# Patient Record
Sex: FEMALE | Race: WHITE | Employment: OTHER | URBAN - METROPOLITAN AREA
[De-identification: names, ages, dates, MRNs, and addresses within clinical notes are randomized per-mention and may not be internally consistent; named-entity substitution may affect disease eponyms.]

---

## 2020-10-08 ENCOUNTER — OFFICE VISIT (OUTPATIENT)
Dept: GYNECOLOGY | Age: 60
End: 2020-10-08
Payer: MEDICARE

## 2020-10-08 VITALS
HEART RATE: 78 BPM | WEIGHT: 154.8 LBS | HEIGHT: 64 IN | BODY MASS INDEX: 26.43 KG/M2 | SYSTOLIC BLOOD PRESSURE: 116 MMHG | DIASTOLIC BLOOD PRESSURE: 93 MMHG

## 2020-10-08 DIAGNOSIS — N85.02 COMPLEX ENDOMETRIAL HYPERPLASIA WITH ATYPIA: Primary | ICD-10-CM

## 2020-10-08 PROCEDURE — 99214 OFFICE O/P EST MOD 30 MIN: CPT | Performed by: OBSTETRICS & GYNECOLOGY

## 2020-10-08 RX ORDER — MEDROXYPROGESTERONE ACETATE 10 MG/1
10 TABLET ORAL DAILY
Qty: 30 TAB | Refills: 3 | Status: SHIPPED | OUTPATIENT
Start: 2020-10-08 | End: 2020-12-21

## 2020-10-08 RX ORDER — DICLOFENAC SODIUM 10 MG/G
2 GEL TOPICAL
COMMUNITY

## 2020-10-08 RX ORDER — CLONAZEPAM 0.5 MG/1
TABLET ORAL
COMMUNITY
Start: 2020-09-18

## 2020-10-08 RX ORDER — OMEPRAZOLE 20 MG/1
20 TABLET, DELAYED RELEASE ORAL DAILY PRN
COMMUNITY

## 2020-10-08 RX ORDER — CYCLOBENZAPRINE HCL 5 MG
5 TABLET ORAL AT BEDTIME
COMMUNITY

## 2020-10-08 RX ORDER — IBUPROFEN 200 MG
400 TABLET ORAL
COMMUNITY

## 2020-10-08 NOTE — PROGRESS NOTES
[address] [place] [address] [address] [place]  P [phone]  F [phone]    Office Note  Patient ID:  Name:  Anna Wolf  MRN:  [id number]  :  1960/60 y.o. Date:  10/8/2020      HISTORY OF PRESENT ILLNESS:  Anna Wolf is a 64 y.o.  postmenopausal female who is being seen for possible endometrial cancer. She is referred by Dr. Robbie Pascual in [facility]. She presented for evaluation of postmenopausal bleeding. She was taken to the OR for a hysteroscopy/D&C. 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 I have been asked to see her in consultation for further evaluation and management. ROS:   and GI review:  Negative  Cardiopulmonary review:  Negative   Musculoskeletal:  Negative    A comprehensive review of systems was negative except for that written in the History of Present Illness. , 10 point ROS        Problem List:  There are no active problems to display for this patient. PMH:  Past Medical History:   Diagnosis Date    Depression     Nerve damage     right leg from surgery       PSH:  Past Surgical History:   Procedure Laterality Date    HX HYSTEROSCOPY      D&C    HX LUMBAR LAMINECTOMY      HX OTHER SURGICAL      broken nose      Social History:  Social History     Tobacco Use    Smoking status: Current Every Day Smoker    Smokeless tobacco: Never Used   Substance Use Topics    Alcohol use: Not on file      Family History:  History reviewed. No pertinent family history. Medications: (reviewed)  Current Outpatient Medications   Medication Sig    omeprazole (PRILOSEC OTC) 20 mg tablet Take 20 mg by mouth daily as needed.  ibuprofen (MOTRIN) 200 mg tablet Take 400 mg by mouth every six (6) hours as needed.  diclofenac (VOLTAREN) 1 % gel Apply 2 g to affected area two (2) times daily as needed.     cyclobenzaprine (FLEXERIL) 5 mg tablet Take 5 mg by mouth At bedtime.  clonazePAM (KlonoPIN) 0.5 mg tablet TAKE 1/2 TO 1 TABLET AT BEDTIME AS NEEDED ANXIETY OR SLEEP    medroxyPROGESTERone (PROVERA) 10 mg tablet Take 1 Tab by mouth daily. No current facility-administered medications for this visit. Allergies: (reviewed)  Allergies   Allergen Reactions    Ciprofloxacin Palpitations          OBJECTIVE:    Physical Exam:  VITAL SIGNS: Vitals:    10/08/20 1459   BP: (!) 116/93   Pulse: 78   Weight: 154 lb 12.8 oz (70.2 kg)   Height: 5' 4\" (1.626 m)     Body mass index is 26.57 kg/m². GENERAL DEVON: Conversant, alert, oriented. No acute distress. HEENT: HEENT. No thyroid enlargement. No JVD. Neck: Supple without restrictions. RESPIRATORY: Clear to auscultation and percussion to the bases. No CVAT. CARDIOVASC: RRR without murmur/rub. GASTROINT: soft, non-tender, without masses or organomegaly   MUSCULOSKEL: no joint tenderness, deformity or swelling   EXTREMITIES: extremities normal, atraumatic, no cyanosis or edema   PELVIC: Vulva and vagina appear normal. Bimanual exam reveals normal uterus and adnexa. RECTAL: Deferred   HENRIQUE SURVEY: No suspicious lymphadenopathy or edema noted. NEURO: Grossly intact. No acute deficit. Lab Data:    No results found for: WBC, HGB, HCT, PLT, MCV, HGBEXT, HCTEXT, PLTEXT, HGBEXT, HCTEXT, PLTEXT  No results found for: NA, K, CL, CO2, AGAP, GLU, BUN, CREA, BUCR, GFRAA, GFRNA, CA      Imaging:  None      IMPRESSION/PLAN:  Antonio Watt is a 64 y.o. female with a working diagnosis of complex endometrial hyperplasia with atypia, but an invasive carcinoma cannot be excluded. I reviewed with Antonio Watt her medical records, physical exam, and review of symptoms. I explained the significant risk of an underlying malignancy based upon her pathology from her D&C.   I have recommended that we proceed with robotic assisted laparoscopic hysterectomy with BSO.  We will send the uterus for frozen section pathology to determine the need for staging and lymph node dissection. We will perform a lymph node mapping prior to hysterectomy so that we can perform a sentinel node dissection rather than a complete lymphadenectomy, in order to reduce the risk of potential complications. She and her family are currently going through a very stressful time with legal issues and she wishes to delay surgery for a while. I did not recommend doing that, but I can understand the need. In order to feel comfortable doing that, I am going to get an MRI of the pelvis to assess the uterus and pelvic nodes. I am also going to start her on oral progesterone, to hopefully suppress any cancer. We will hopefully be able to get her on the schedule within a couple of months.       Signed By: Jenaro Liu MD     10/8/2020/1:24 PM

## 2020-10-08 NOTE — LETTER
10/8/2020 3:52 PM 
 
Patient:  Jj Sheridan YOB: 1960 Date of Visit: 10/8/2020 Dear Steffanie Hwang MD 
VIA Facsimile: [phone]: Thank you for referring Ms. Jj Sheridan to me for evaluation/treatment. Below are the relevant portions of my assessment and plan of care. [address] [facility], [address] 
P [phone]  F [phone] Office Note Patient ID: 
Name:  Jj Sheridan MRN:  [id number] :  1960/60 y.o. Date:  10/8/2020 HISTORY OF PRESENT ILLNESS: 
Jj Sheridan is a 64 y.o.  postmenopausal female who is being seen for possible endometrial cancer. She is referred by Dr. Steffanie Hwang in [facility]. She presented for evaluation of postmenopausal bleeding. She was taken to the OR for a hysteroscopy/D&C. 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 I have been asked to see her in consultation for further evaluation and management. ROS: 
 and GI review:  Negative Cardiopulmonary review:  Negative Musculoskeletal:  Negative A comprehensive review of systems was negative except for that written in the History of Present Illness. , 10 point ROS Problem List: 
There are no active problems to display for this patient. PMH: 
Past Medical History:  
Diagnosis Date  Depression  Nerve damage   
 right leg from surgery PSH: 
Past Surgical History:  
Procedure Laterality Date  HX HYSTEROSCOPY    
 D&C  
 HX LUMBAR LAMINECTOMY  HX OTHER SURGICAL    
 broken nose Social History: 
Social History Tobacco Use  Smoking status: Current Every Day Smoker  Smokeless tobacco: Never Used Substance Use Topics  Alcohol use: Not on file Family History: 
History reviewed. No pertinent family history. Medications: (reviewed) Current Outpatient Medications Medication Sig  
 omeprazole (PRILOSEC OTC) 20 mg tablet Take 20 mg by mouth daily as needed.  ibuprofen (MOTRIN) 200 mg tablet Take 400 mg by mouth every six (6) hours as needed.  diclofenac (VOLTAREN) 1 % gel Apply 2 g to affected area two (2) times daily as needed.  cyclobenzaprine (FLEXERIL) 5 mg tablet Take 5 mg by mouth At bedtime.  clonazePAM (KlonoPIN) 0.5 mg tablet TAKE 1/2 TO 1 TABLET AT BEDTIME AS NEEDED ANXIETY OR SLEEP  
 medroxyPROGESTERone (PROVERA) 10 mg tablet Take 1 Tab by mouth daily. No current facility-administered medications for this visit. Allergies: (reviewed) Allergies Allergen Reactions  Ciprofloxacin Palpitations OBJECTIVE: 
 
Physical Exam: VITAL SIGNS: Vitals:  
 10/08/20 1459 BP: (!) 116/93 Pulse: 78 Weight: 154 lb 12.8 oz (70.2 kg) Height: 5' 4\" (1.626 m) Body mass index is 26.57 kg/m². GENERAL DEVON: Conversant, alert, oriented. No acute distress. HEENT: HEENT. No thyroid enlargement. No JVD. Neck: Supple without restrictions. RESPIRATORY: Clear to auscultation and percussion to the bases. No CVAT. CARDIOVASC: RRR without murmur/rub. GASTROINT: soft, non-tender, without masses or organomegaly MUSCULOSKEL: no joint tenderness, deformity or swelling EXTREMITIES: extremities normal, atraumatic, no cyanosis or edema PELVIC: Vulva and vagina appear normal. Bimanual exam reveals normal uterus and adnexa. RECTAL: Deferred HENRIQUE SURVEY: No suspicious lymphadenopathy or edema noted. NEURO: Grossly intact. No acute deficit. Lab Data: 
 
No results found for: WBC, HGB, HCT, PLT, MCV, HGBEXT, HCTEXT, PLTEXT, HGBEXT, HCTEXT, PLTEXT No results found for: NA, K, CL, CO2, AGAP, GLU, BUN, CREA, BUCR, GFRAA, GFRNA, CA Imaging: 
None IMPRESSION/PLAN: 
Sherice Rodrigez is a 64 y.o. female with a working diagnosis of complex endometrial hyperplasia with atypia, but an invasive carcinoma cannot be excluded. I reviewed with Willow Lopez her medical records, physical exam, and review of symptoms. I explained the significant risk of an underlying malignancy based upon her pathology from her D&C. I have recommended that we proceed with robotic assisted laparoscopic hysterectomy with BSO. We will send the uterus for frozen section pathology to determine the need for staging and lymph node dissection. We will perform a lymph node mapping prior to hysterectomy so that we can perform a sentinel node dissection rather than a complete lymphadenectomy, in order to reduce the risk of potential complications. She and her family are currently going through a very stressful time with legal issues and she wishes to delay surgery for a while. I did not recommend doing that, but I can understand the need. In order to feel comfortable doing that, I am going to get an MRI of the pelvis to assess the uterus and pelvic nodes. I am also going to start her on oral progesterone, to hopefully suppress any cancer. We will hopefully be able to get her on the schedule within a couple of months. Signed By: Deshaun Huang MD   
 10/8/2020/1:24 PM  
 
 
New patient referred by [facility] for possible endometrial cancer. If you have questions, please do not hesitate to call me. I look forward to following Ms. Wright along with you.  
 
 
 
Sincerely, 
 
 
Deshaun Huang MD

## 2020-11-17 ENCOUNTER — TELEPHONE (OUTPATIENT)
Dept: GYNECOLOGY | Age: 60
End: 2020-11-17

## 2020-11-17 NOTE — TELEPHONE ENCOUNTER
Patient states she is taking the progesterone as prescribed and plans on scheduling surgery in January.

## 2020-12-11 ENCOUNTER — HOSPITAL ENCOUNTER (OUTPATIENT)
Dept: PREADMISSION TESTING | Age: 60
Discharge: HOME | End: 2020-12-11
Payer: MEDICARE

## 2020-12-11 VITALS
DIASTOLIC BLOOD PRESSURE: 82 MMHG | WEIGHT: 148.37 LBS | BODY MASS INDEX: 26.29 KG/M2 | TEMPERATURE: 98.5 F | HEART RATE: 82 BPM | HEIGHT: 63 IN | SYSTOLIC BLOOD PRESSURE: 123 MMHG | OXYGEN SATURATION: 99 %

## 2020-12-11 LAB
ALBUMIN SERPL-MCNC: 4.1 G/DL (ref 3.5–5)
ALBUMIN/GLOB SERPL: 1.2 {RATIO} (ref 1.1–2.2)
ALP SERPL-CCNC: 59 U/L (ref 45–117)
ALT SERPL-CCNC: 13 U/L (ref 12–78)
ANION GAP SERPL CALC-SCNC: 7 MMOL/L (ref 5–15)
AST SERPL-CCNC: 11 U/L (ref 15–37)
BASOPHILS # BLD: 0 K/UL (ref 0–0.1)
BASOPHILS NFR BLD: 1 % (ref 0–1)
BILIRUB SERPL-MCNC: 0.4 MG/DL (ref 0.2–1)
BUN SERPL-MCNC: 14 MG/DL (ref 6–20)
BUN/CREAT SERPL: 13 (ref 12–20)
CALCIUM SERPL-MCNC: 9.8 MG/DL (ref 8.5–10.1)
CHLORIDE SERPL-SCNC: 107 MMOL/L (ref 97–108)
CO2 SERPL-SCNC: 27 MMOL/L (ref 21–32)
CREAT SERPL-MCNC: 1.1 MG/DL (ref 0.55–1.02)
DIFFERENTIAL METHOD BLD: NORMAL
EOSINOPHIL # BLD: 0.1 K/UL (ref 0–0.4)
EOSINOPHIL NFR BLD: 2 % (ref 0–7)
ERYTHROCYTE [DISTWIDTH] IN BLOOD BY AUTOMATED COUNT: 13.8 % (ref 11.5–14.5)
GLOBULIN SER CALC-MCNC: 3.5 G/DL (ref 2–4)
GLUCOSE SERPL-MCNC: 98 MG/DL (ref 65–100)
HCT VFR BLD AUTO: 40.9 % (ref 35–47)
HGB BLD-MCNC: 13.1 G/DL (ref 11.5–16)
IMM GRANULOCYTES # BLD AUTO: 0 K/UL (ref 0–0.04)
IMM GRANULOCYTES NFR BLD AUTO: 0 % (ref 0–0.5)
LYMPHOCYTES # BLD: 2.5 K/UL (ref 0.8–3.5)
LYMPHOCYTES NFR BLD: 41 % (ref 12–49)
MCH RBC QN AUTO: 29 PG (ref 26–34)
MCHC RBC AUTO-ENTMCNC: 32 G/DL (ref 30–36.5)
MCV RBC AUTO: 90.5 FL (ref 80–99)
MONOCYTES # BLD: 0.3 K/UL (ref 0–1)
MONOCYTES NFR BLD: 5 % (ref 5–13)
NEUTS SEG # BLD: 3.2 K/UL (ref 1.8–8)
NEUTS SEG NFR BLD: 51 % (ref 32–75)
NRBC # BLD: 0 K/UL (ref 0–0.01)
NRBC BLD-RTO: 0 PER 100 WBC
PLATELET # BLD AUTO: 355 K/UL (ref 150–400)
PMV BLD AUTO: 10 FL (ref 8.9–12.9)
POTASSIUM SERPL-SCNC: 4.1 MMOL/L (ref 3.5–5.1)
PROT SERPL-MCNC: 7.6 G/DL (ref 6.4–8.2)
RBC # BLD AUTO: 4.52 M/UL (ref 3.8–5.2)
SODIUM SERPL-SCNC: 141 MMOL/L (ref 136–145)
WBC # BLD AUTO: 6.2 K/UL (ref 3.6–11)

## 2020-12-11 PROCEDURE — 36415 COLL VENOUS BLD VENIPUNCTURE: CPT

## 2020-12-11 PROCEDURE — 80053 COMPREHEN METABOLIC PANEL: CPT

## 2020-12-11 PROCEDURE — 85025 COMPLETE CBC W/AUTO DIFF WBC: CPT

## 2020-12-11 RX ORDER — MELATONIN 5 MG
CAPSULE ORAL
COMMUNITY

## 2020-12-11 NOTE — PERIOP NOTES
Preoperative instructions reviewed with patient. Patient given SSI infection FAQS sheet. Given two bottles of skin prep chlorhexidine soap; given written and verbal instructions on use. Patient was given the opportunity to ask questions on the information provided. INFORMATION REGARDING COVID 19 TESTING PRIOR TO SURGERY WAS PROVIDED TO THE PATIENT WITH THE OPPORTUNITY FOR QUESTIONS. PATIENT VERBALIZED UNDERSTANDING.

## 2020-12-14 NOTE — PROGRESS NOTES
Virtual Visit Consult, pt is scheduled for surgery on 12/21/2020, Dr. Amber Suazo patient, Dr. Amber Suazo is out of office the week of her surgery    1. Have you been to the ER, urgent care clinic since your last visit? Hospitalized since your last visit?  no    2. Have you seen or consulted any other health care providers outside of the [address] [place] [address] since your last visit? Include any pap smears or colon screening.    no

## 2020-12-15 ENCOUNTER — VIRTUAL VISIT (OUTPATIENT)
Dept: GYNECOLOGY | Age: 60
End: 2020-12-15
Payer: MEDICARE

## 2020-12-15 DIAGNOSIS — N85.00 ENDOMETRIAL HYPERPLASIA: Primary | ICD-10-CM

## 2020-12-15 DIAGNOSIS — N95.0 PMB (POSTMENOPAUSAL BLEEDING): ICD-10-CM

## 2020-12-15 PROCEDURE — 99214 OFFICE O/P EST MOD 30 MIN: CPT | Performed by: OBSTETRICS & GYNECOLOGY

## 2020-12-15 PROCEDURE — G8427 DOCREV CUR MEDS BY ELIG CLIN: HCPCS | Performed by: OBSTETRICS & GYNECOLOGY

## 2020-12-15 PROCEDURE — G8432 DEP SCR NOT DOC, RNG: HCPCS | Performed by: OBSTETRICS & GYNECOLOGY

## 2020-12-15 PROCEDURE — 3017F COLORECTAL CA SCREEN DOC REV: CPT | Performed by: OBSTETRICS & GYNECOLOGY

## 2020-12-15 PROCEDURE — G0463 HOSPITAL OUTPT CLINIC VISIT: HCPCS | Performed by: OBSTETRICS & GYNECOLOGY

## 2020-12-15 NOTE — H&P (VIEW-ONLY)
Vickey Bridges is a 64 y.o. female who was seen by synchronous (real-time) audio-video technology on 12/15/2020 [address] [facility], [address] [facility], [address] 
P [phone]  F [phone] Office Note Patient ID: 
Name:  Vickey Bridges MRN:  [id number] :  1960/60 y.o. Date:  12/15/2020 HISTORY OF PRESENT ILLNESS: 
Ms. Vickey Bridges is a 64 y.o. 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 I am seeing her today at Dr. Manda ultz as the patient would like surgery before the holidays but Dr. Rowena Carter does not have any surgical time next week as he is on vacation. She is scheduled with me for this coming Monday. Today is just to meet and review her case. Initial History: 
Vickey Bridges is a 64 y.o.  postmenopausal female who is being seen for possible endometrial cancer. She is referred by Dr. Garnet Lesches in [facility]. She presented for evaluation of postmenopausal bleeding. She was taken to the OR for a hysteroscopy/D&C. 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 I have been asked to see her in consultation for further evaluation and management. ROS: 
 and GI review:  Negative Cardiopulmonary review:  Negative Musculoskeletal:  Negative A comprehensive review of systems was negative except for that written in the History of Present Illness. , 10 point ROS Problem List: 
Patient Active Problem List  
 Diagnosis Date Noted  PMB (postmenopausal bleeding) 12/15/2020  Endometrial hyperplasia 12/15/2020 PMH: 
Past Medical History:  
Diagnosis Date  Arthritis  Depression  Nerve damage   
 right leg from surgery PSH: 
Past Surgical History:  
Procedure Laterality Date [facility] HYSTEROSCOPY    
 D&C  
  HX LUMBAR LAMINECTOMY  09/05/2018  HX OTHER SURGICAL  1989  
 broken nose Social History: 
Social History Tobacco Use  Smoking status: Current Every Day Smoker Packs/day: 1.00 Years: 29.00 Pack years: 29.00  Smokeless tobacco: Never Used Substance Use Topics  Alcohol use: Never Frequency: Never Family History: 
Family History Problem Relation Age of Onset  Lung Disease Mother  Heart Disease Mother  Lung Disease Father  Lung Disease Brother  Anesth Problems Neg Hx Medications: (reviewed) Current Outpatient Medications Medication Sig  cyclobenzaprine (FLEXERIL) 5 mg tablet Take 5 mg by mouth At bedtime.  clonazePAM (KlonoPIN) 0.5 mg tablet TAKE 1/2 TO 1 TABLET AT BEDTIME AS NEEDED ANXIETY OR SLEEP  
 medroxyPROGESTERone (PROVERA) 10 mg tablet Take 1 Tab by mouth daily.  melatonin 5 mg cap capsule Take  by mouth nightly.  omeprazole (PRILOSEC OTC) 20 mg tablet Take 20 mg by mouth daily as needed.  ibuprofen (MOTRIN) 200 mg tablet Take 400 mg by mouth every six (6) hours as needed.  diclofenac (VOLTAREN) 1 % gel Apply 2 g to affected area two (2) times daily as needed. No current facility-administered medications for this visit. Allergies: (reviewed) Allergies Allergen Reactions  Ciprofloxacin Palpitations OBJECTIVE: 
*deferred today given video-conference visit for ongoing COVID-19 pandemic* Physical Exam: VITAL SIGNS: There were no vitals filed for this visit. There is no height or weight on file to calculate BMI. GENERAL DEVON: Conversant, alert, oriented. No acute distress. HEENT: HEENT. No thyroid enlargement. No JVD. Neck: Supple without restrictions. RESPIRATORY: Clear to auscultation and percussion to the bases. No CVAT. CARDIOVASC: RRR without murmur/rub. GASTROINT: soft, non-tender, without masses or organomegaly MUSCULOSKEL: no joint tenderness, deformity or swelling EXTREMITIES: extremities normal, atraumatic, no cyanosis or edema PELVIC: Vulva and vagina appear normal. Bimanual exam reveals normal uterus and adnexa. RECTAL: Deferred HENRIQUE SURVEY: No suspicious lymphadenopathy or edema noted. NEURO: Grossly intact. No acute deficit. Lab Data: 
 
Lab Results Component Value Date/Time WBC 6.2 12/11/2020 11:03 AM  
 HGB 13.1 12/11/2020 11:03 AM  
 HCT 40.9 12/11/2020 11:03 AM  
 PLATELET 894 47/93/1291 11:03 AM  
 MCV 90.5 12/11/2020 11:03 AM  
 
Lab Results Component Value Date/Time Sodium 141 12/11/2020 11:03 AM  
 Potassium 4.1 12/11/2020 11:03 AM  
 Chloride 107 12/11/2020 11:03 AM  
 CO2 27 12/11/2020 11:03 AM  
 Anion gap 7 12/11/2020 11:03 AM  
 Glucose 98 12/11/2020 11:03 AM  
 BUN 14 12/11/2020 11:03 AM  
 Creatinine 1.10 (H) 12/11/2020 11:03 AM  
 BUN/Creatinine ratio 13 12/11/2020 11:03 AM  
 GFR est AA >60 12/11/2020 11:03 AM  
 GFR est non-AA 51 (L) 12/11/2020 11:03 AM  
 Calcium 9.8 12/11/2020 11:03 AM  
 
 
 
 
IMPRESSION/PLAN: 
Juliette Leal is a 64 y.o. female with a working diagnosis of complex endometrial hyperplasia with atypia, but an invasive carcinoma cannot be excluded. Patient Active Problem List  
Diagnosis Code  PMB (postmenopausal bleeding) N95.0  Endometrial hyperplasia N85.00  
 
 
 
 
 I reviewed with Maria Fernandamarshallchet Correa her medical records, physical exam, and review of symptoms as she is new to me. As stated above, I am seeing this patient at Dr. Nneka Manuel request for surgery next week as he will be out of the office. The patient has some family issues ongoing and next week is a good time for her to have her surgery. She is posted for Monday 12/21/2020. Given I do not have robotic block time next week, we will plan to post her case laparoscopic. Plan for TLH/BSO/sentinel lymph node mapping and biopsy, possible exploratory laparotomy, possible pelvic and/or para-aortic LND. She will remain on her progesterone until the time of surgery. All questions and concerns were addressed with the patient and she is comfortable with the plan. Husam Storey MD 
 
 
Pursuant to the emergency declaration unde the [facility], [address] waiver authority and the [employer] Act, this Virtual Visit was conducted, with patient's consent, to reduce the patient's risk of exposure to COVID-19 and provide continuity of care for an established patient. Services were provided through a video synchronous discussion virtually to substitute for in-person clinic visit. I spent at least 25 minutes with this established patient, and >50% of the time was spent counseling and/or coordinating care regarding endometrial hyperplasia Husam Storey MD

## 2020-12-15 NOTE — PROGRESS NOTES
Arvin Romero is a 64 y.o. female who was seen by synchronous (real-time) audio-video technology on 12/15/2020         [address] [facility],[address] [facility], [address], [address]  P [phone]  F [phone]    Office Note  Patient ID:  Name:  Arvin Romero  MRN:  [id number]  :  1960/60 y.o. Date:  12/15/2020      HISTORY OF PRESENT ILLNESS:  Ms. Arvin Romero is a 64 y.o. 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 I am seeing her today at Dr. Rip lutz as the patient would like surgery before the holidays but Dr. Millie Chowdary does not have any surgical time next week as he is on vacation. She is scheduled with me for this coming Monday. Today is just to meet and review her case. Initial History:  Arvin Romero is a 64 y.o.  postmenopausal female who is being seen for possible endometrial cancer. She is referred by Dr. Frank Phelps in [facility]. She presented for evaluation of postmenopausal bleeding. She was taken to the OR for a hysteroscopy/D&C. 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 I have been asked to see her in consultation for further evaluation and management. ROS:   and GI review:  Negative  Cardiopulmonary review:  Negative   Musculoskeletal:  Negative    A comprehensive review of systems was negative except for that written in the History of Present Illness. , 10 point ROS        Problem List:  Patient Active Problem List    Diagnosis Date Noted    PMB (postmenopausal bleeding) 12/15/2020    Endometrial hyperplasia 12/15/2020     PMH:  Past Medical History:   Diagnosis Date    Arthritis     Depression     Nerve damage     right leg from surgery       PSH:  Past Surgical History:   Procedure Laterality Date    HX DILATION AND CURETTAGE      HX HYSTEROSCOPY      D&C    HX LUMBAR LAMINECTOMY  09/05/2018    HX OTHER SURGICAL  1989    broken nose      Social History:  Social History     Tobacco Use    Smoking status: Current Every Day Smoker     Packs/day: 1.00     Years: 29.00     Pack years: 29.00    Smokeless tobacco: Never Used   Substance Use Topics    Alcohol use: Never     Frequency: Never      Family History:  Family History   Problem Relation Age of Onset    Lung Disease Mother     Heart Disease Mother     Lung Disease Father     Lung Disease Brother     Anesth Problems Neg Hx       Medications: (reviewed)  Current Outpatient Medications   Medication Sig    cyclobenzaprine (FLEXERIL) 5 mg tablet Take 5 mg by mouth At bedtime.  clonazePAM (KlonoPIN) 0.5 mg tablet TAKE 1/2 TO 1 TABLET AT BEDTIME AS NEEDED ANXIETY OR SLEEP    medroxyPROGESTERone (PROVERA) 10 mg tablet Take 1 Tab by mouth daily.  melatonin 5 mg cap capsule Take  by mouth nightly.  omeprazole (PRILOSEC OTC) 20 mg tablet Take 20 mg by mouth daily as needed.  ibuprofen (MOTRIN) 200 mg tablet Take 400 mg by mouth every six (6) hours as needed.  diclofenac (VOLTAREN) 1 % gel Apply 2 g to affected area two (2) times daily as needed. No current facility-administered medications for this visit. Allergies: (reviewed)  Allergies   Allergen Reactions    Ciprofloxacin Palpitations          OBJECTIVE:  *deferred today given video-conference visit for ongoing COVID-19 pandemic*    Physical Exam:  VITAL SIGNS: There were no vitals filed for this visit. There is no height or weight on file to calculate BMI. GENERAL DEVON: Conversant, alert, oriented. No acute distress. HEENT: HEENT. No thyroid enlargement. No JVD. Neck: Supple without restrictions. RESPIRATORY: Clear to auscultation and percussion to the bases. No CVAT. CARDIOVASC: RRR without murmur/rub.    GASTROINT: soft, non-tender, without masses or organomegaly   MUSCULOSKEL: no joint tenderness, deformity or swelling EXTREMITIES: extremities normal, atraumatic, no cyanosis or edema   PELVIC: Vulva and vagina appear normal. Bimanual exam reveals normal uterus and adnexa. RECTAL: Deferred   HENRIQUE SURVEY: No suspicious lymphadenopathy or edema noted. NEURO: Grossly intact. No acute deficit. Lab Data:    Lab Results   Component Value Date/Time    WBC 6.2 12/11/2020 11:03 AM    HGB 13.1 12/11/2020 11:03 AM    HCT 40.9 12/11/2020 11:03 AM    PLATELET 873 48/30/7240 11:03 AM    MCV 90.5 12/11/2020 11:03 AM     Lab Results   Component Value Date/Time    Sodium 141 12/11/2020 11:03 AM    Potassium 4.1 12/11/2020 11:03 AM    Chloride 107 12/11/2020 11:03 AM    CO2 27 12/11/2020 11:03 AM    Anion gap 7 12/11/2020 11:03 AM    Glucose 98 12/11/2020 11:03 AM    BUN 14 12/11/2020 11:03 AM    Creatinine 1.10 (H) 12/11/2020 11:03 AM    BUN/Creatinine ratio 13 12/11/2020 11:03 AM    GFR est AA >60 12/11/2020 11:03 AM    GFR est non-AA 51 (L) 12/11/2020 11:03 AM    Calcium 9.8 12/11/2020 11:03 AM           IMPRESSION/PLAN:  Merced Murphy is a 64 y.o. female with a working diagnosis of complex endometrial hyperplasia with atypia, but an invasive carcinoma cannot be excluded. Patient Active Problem List   Diagnosis Code    PMB (postmenopausal bleeding) N95.0    Endometrial hyperplasia N85.00           I reviewed with Merced Murphy her medical records, physical exam, and review of symptoms as she is new to me. As stated above, I am seeing this patient at Dr. Dilma Walsh request for surgery next week as he will be out of the office. The patient has some family issues ongoing and next week is a good time for her to have her surgery. She is posted for Monday 12/21/2020. Given I do not have robotic block time next week, we will plan to post her case laparoscopic. Plan for TLH/BSO/sentinel lymph node mapping and biopsy, possible exploratory laparotomy, possible pelvic and/or para-aortic LND.  She will remain on her progesterone until the time of surgery. All questions and concerns were addressed with the patient and she is comfortable with the plan. Douglas Cohen MD      Pursuant to the emergency declaration unde the [facility] waiver authority and the [employer] Act, this Virtual Visit was conducted, with patient's consent, to reduce the patient's risk of exposure to COVID-19 and provide continuity of care for an established patient. Services were provided through a video synchronous discussion virtually to substitute for in-person clinic visit.      I spent at least 25 minutes with this established patient, and >50% of the time was spent counseling and/or coordinating care regarding endometrial hyperplasia    Douglas Cohen MD

## 2020-12-17 ENCOUNTER — TRANSCRIBE ORDER (OUTPATIENT)
Dept: REGISTRATION | Age: 60
End: 2020-12-17

## 2020-12-17 ENCOUNTER — HOSPITAL ENCOUNTER (OUTPATIENT)
Dept: PREADMISSION TESTING | Age: 60
Discharge: HOME | End: 2020-12-17
Payer: MEDICARE

## 2020-12-17 DIAGNOSIS — Z01.812 PRE-PROCEDURE LAB EXAM: Primary | ICD-10-CM

## 2020-12-17 DIAGNOSIS — Z01.812 PRE-PROCEDURE LAB EXAM: ICD-10-CM

## 2020-12-17 PROCEDURE — 87635 SARS-COV-2 COVID-19 AMP PRB: CPT

## 2020-12-18 ENCOUNTER — ANESTHESIA EVENT (OUTPATIENT)
Dept: SURGERY | Age: 60
End: 2020-12-18
Payer: MEDICARE

## 2020-12-18 LAB — SARS-COV-2, COV2NT: NOT DETECTED

## 2020-12-21 ENCOUNTER — HOSPITAL ENCOUNTER (OUTPATIENT)
Age: 60
Setting detail: OBSERVATION
Discharge: HOME | End: 2020-12-21
Attending: OBSTETRICS & GYNECOLOGY | Admitting: OBSTETRICS & GYNECOLOGY
Payer: MEDICARE

## 2020-12-21 ENCOUNTER — ANESTHESIA (OUTPATIENT)
Dept: SURGERY | Age: 60
End: 2020-12-21
Payer: MEDICARE

## 2020-12-21 VITALS
DIASTOLIC BLOOD PRESSURE: 81 MMHG | WEIGHT: 148 LBS | TEMPERATURE: 98.1 F | SYSTOLIC BLOOD PRESSURE: 125 MMHG | HEART RATE: 91 BPM | OXYGEN SATURATION: 98 % | BODY MASS INDEX: 26.22 KG/M2 | RESPIRATION RATE: 16 BRPM | HEIGHT: 63 IN

## 2020-12-21 DIAGNOSIS — G89.18 POSTOPERATIVE PAIN: Primary | ICD-10-CM

## 2020-12-21 PROBLEM — N85.01 COMPLEX ENDOMETRIAL HYPERPLASIA: Status: ACTIVE | Noted: 2020-12-21

## 2020-12-21 PROCEDURE — 74011250636 HC RX REV CODE- 250/636: Performed by: NURSE ANESTHETIST, CERTIFIED REGISTERED

## 2020-12-21 PROCEDURE — 74011000250 HC RX REV CODE- 250: Performed by: NURSE ANESTHETIST, CERTIFIED REGISTERED

## 2020-12-21 PROCEDURE — 76060000034 HC ANESTHESIA 1.5 TO 2 HR: Performed by: OBSTETRICS & GYNECOLOGY

## 2020-12-21 PROCEDURE — 74011000254 HC RX REV CODE- 254: Performed by: OBSTETRICS & GYNECOLOGY

## 2020-12-21 PROCEDURE — 77030026438 HC STYL ET INTUB CARD -A: Performed by: ANESTHESIOLOGY

## 2020-12-21 PROCEDURE — 77030040830 HC CATH URETH FOL MDII -A: Performed by: OBSTETRICS & GYNECOLOGY

## 2020-12-21 PROCEDURE — 99218 HC RM OBSERVATION: CPT

## 2020-12-21 PROCEDURE — 88112 CYTOPATH CELL ENHANCE TECH: CPT

## 2020-12-21 PROCEDURE — 77030016151 HC PROTCTR LNS DFOG COVD -B: Performed by: OBSTETRICS & GYNECOLOGY

## 2020-12-21 PROCEDURE — 77030010507 HC ADH SKN DERMBND J&J -B: Performed by: OBSTETRICS & GYNECOLOGY

## 2020-12-21 PROCEDURE — 88342 IMHCHEM/IMCYTCHM 1ST ANTB: CPT

## 2020-12-21 PROCEDURE — 77030008756 HC TU IRR SUC STRY -B: Performed by: OBSTETRICS & GYNECOLOGY

## 2020-12-21 PROCEDURE — 77030022703 HC LIGASURE  BLNT LAPSCP SEAL COVD -E: Performed by: OBSTETRICS & GYNECOLOGY

## 2020-12-21 PROCEDURE — 77030022704 HC SUT VLOC COVD -B: Performed by: OBSTETRICS & GYNECOLOGY

## 2020-12-21 PROCEDURE — 88307 TISSUE EXAM BY PATHOLOGIST: CPT

## 2020-12-21 PROCEDURE — 77030009957 HC RELD ENDOSTCH COVD -C: Performed by: OBSTETRICS & GYNECOLOGY

## 2020-12-21 PROCEDURE — 74011250636 HC RX REV CODE- 250/636: Performed by: PHYSICIAN ASSISTANT

## 2020-12-21 PROCEDURE — 74011250636 HC RX REV CODE- 250/636: Performed by: ANESTHESIOLOGY

## 2020-12-21 PROCEDURE — 77030040361 HC SLV COMPR DVT MDII -B: Performed by: OBSTETRICS & GYNECOLOGY

## 2020-12-21 PROCEDURE — 74011250637 HC RX REV CODE- 250/637: Performed by: PHYSICIAN ASSISTANT

## 2020-12-21 PROCEDURE — 77030010031 HC SCIS ENDOSC MPLR J&J -C: Performed by: OBSTETRICS & GYNECOLOGY

## 2020-12-21 PROCEDURE — 77030008771 HC TU NG SALEM SUMP -A: Performed by: ANESTHESIOLOGY

## 2020-12-21 PROCEDURE — 77030012799 HC TRCR GELPRT BLN AMR -B: Performed by: OBSTETRICS & GYNECOLOGY

## 2020-12-21 PROCEDURE — 77030020829: Performed by: OBSTETRICS & GYNECOLOGY

## 2020-12-21 PROCEDURE — 2709999900 HC NON-CHARGEABLE SUPPLY: Performed by: OBSTETRICS & GYNECOLOGY

## 2020-12-21 PROCEDURE — 74011250637 HC RX REV CODE- 250/637: Performed by: ANESTHESIOLOGY

## 2020-12-21 PROCEDURE — 77030012770 HC TRCR OPT FX AMR -B: Performed by: OBSTETRICS & GYNECOLOGY

## 2020-12-21 PROCEDURE — 77030040922 HC BLNKT HYPOTHRM STRY -A

## 2020-12-21 PROCEDURE — 74011000250 HC RX REV CODE- 250: Performed by: OBSTETRICS & GYNECOLOGY

## 2020-12-21 PROCEDURE — 77030018778 HC MANIP UTER VCAR CNMD -B: Performed by: OBSTETRICS & GYNECOLOGY

## 2020-12-21 PROCEDURE — 77030008684 HC TU ET CUF COVD -B: Performed by: ANESTHESIOLOGY

## 2020-12-21 PROCEDURE — 77030002933 HC SUT MCRYL J&J -A: Performed by: OBSTETRICS & GYNECOLOGY

## 2020-12-21 PROCEDURE — 77030031139 HC SUT VCRL2 J&J -A: Performed by: OBSTETRICS & GYNECOLOGY

## 2020-12-21 PROCEDURE — 77030013079 HC BLNKT BAIR HGGR 3M -A: Performed by: ANESTHESIOLOGY

## 2020-12-21 PROCEDURE — 74011000258 HC RX REV CODE- 258: Performed by: OBSTETRICS & GYNECOLOGY

## 2020-12-21 PROCEDURE — 77030014008 HC SPNG HEMSTAT J&J -C: Performed by: OBSTETRICS & GYNECOLOGY

## 2020-12-21 PROCEDURE — 76210000006 HC OR PH I REC 0.5 TO 1 HR: Performed by: OBSTETRICS & GYNECOLOGY

## 2020-12-21 PROCEDURE — 76010000153 HC OR TIME 1.5 TO 2 HR: Performed by: OBSTETRICS & GYNECOLOGY

## 2020-12-21 PROCEDURE — 77030020263 HC SOL INJ SOD CL0.9% LFCR 1000ML: Performed by: OBSTETRICS & GYNECOLOGY

## 2020-12-21 RX ORDER — FENTANYL CITRATE 50 UG/ML
50 INJECTION, SOLUTION INTRAMUSCULAR; INTRAVENOUS AS NEEDED
Status: DISCONTINUED | OUTPATIENT
Start: 2020-12-21 | End: 2020-12-21 | Stop reason: HOSPADM

## 2020-12-21 RX ORDER — ONDANSETRON 2 MG/ML
4 INJECTION INTRAMUSCULAR; INTRAVENOUS
Status: DISCONTINUED | OUTPATIENT
Start: 2020-12-21 | End: 2020-12-21 | Stop reason: HOSPADM

## 2020-12-21 RX ORDER — SODIUM CHLORIDE 0.9 % (FLUSH) 0.9 %
5-40 SYRINGE (ML) INJECTION AS NEEDED
Status: DISCONTINUED | OUTPATIENT
Start: 2020-12-21 | End: 2020-12-21 | Stop reason: HOSPADM

## 2020-12-21 RX ORDER — ONDANSETRON 2 MG/ML
INJECTION INTRAMUSCULAR; INTRAVENOUS AS NEEDED
Status: DISCONTINUED | OUTPATIENT
Start: 2020-12-21 | End: 2020-12-21 | Stop reason: HOSPADM

## 2020-12-21 RX ORDER — TRAMADOL HYDROCHLORIDE 50 MG/1
50 TABLET ORAL
Qty: 15 TAB | Refills: 0 | Status: SHIPPED | OUTPATIENT
Start: 2020-12-21 | End: 2020-12-25

## 2020-12-21 RX ORDER — SODIUM CHLORIDE, SODIUM LACTATE, POTASSIUM CHLORIDE, CALCIUM CHLORIDE 600; 310; 30; 20 MG/100ML; MG/100ML; MG/100ML; MG/100ML
1000 INJECTION, SOLUTION INTRAVENOUS CONTINUOUS
Status: DISCONTINUED | OUTPATIENT
Start: 2020-12-21 | End: 2020-12-21 | Stop reason: HOSPADM

## 2020-12-21 RX ORDER — BUPIVACAINE HYDROCHLORIDE 5 MG/ML
INJECTION, SOLUTION EPIDURAL; INTRACAUDAL AS NEEDED
Status: DISCONTINUED | OUTPATIENT
Start: 2020-12-21 | End: 2020-12-21 | Stop reason: HOSPADM

## 2020-12-21 RX ORDER — ACETAMINOPHEN 325 MG/1
650 TABLET ORAL ONCE
Status: COMPLETED | OUTPATIENT
Start: 2020-12-21 | End: 2020-12-21

## 2020-12-21 RX ORDER — MIDAZOLAM HYDROCHLORIDE 1 MG/ML
0.5 INJECTION, SOLUTION INTRAMUSCULAR; INTRAVENOUS
Status: DISCONTINUED | OUTPATIENT
Start: 2020-12-21 | End: 2020-12-21 | Stop reason: HOSPADM

## 2020-12-21 RX ORDER — KETOROLAC TROMETHAMINE 30 MG/ML
INJECTION, SOLUTION INTRAMUSCULAR; INTRAVENOUS AS NEEDED
Status: DISCONTINUED | OUTPATIENT
Start: 2020-12-21 | End: 2020-12-21 | Stop reason: HOSPADM

## 2020-12-21 RX ORDER — HYDROCODONE BITARTRATE AND ACETAMINOPHEN 5; 325 MG/1; MG/1
1 TABLET ORAL AS NEEDED
Status: DISCONTINUED | OUTPATIENT
Start: 2020-12-21 | End: 2020-12-21 | Stop reason: HOSPADM

## 2020-12-21 RX ORDER — SODIUM CHLORIDE 0.9 % (FLUSH) 0.9 %
5-40 SYRINGE (ML) INJECTION EVERY 8 HOURS
Status: DISCONTINUED | OUTPATIENT
Start: 2020-12-21 | End: 2020-12-21 | Stop reason: HOSPADM

## 2020-12-21 RX ORDER — INDOCYANINE GREEN AND WATER 25 MG
KIT INJECTION AS NEEDED
Status: DISCONTINUED | OUTPATIENT
Start: 2020-12-21 | End: 2020-12-21 | Stop reason: HOSPADM

## 2020-12-21 RX ORDER — DEXAMETHASONE SODIUM PHOSPHATE 4 MG/ML
INJECTION, SOLUTION INTRA-ARTICULAR; INTRALESIONAL; INTRAMUSCULAR; INTRAVENOUS; SOFT TISSUE AS NEEDED
Status: DISCONTINUED | OUTPATIENT
Start: 2020-12-21 | End: 2020-12-21 | Stop reason: HOSPADM

## 2020-12-21 RX ORDER — MORPHINE SULFATE 10 MG/ML
2 INJECTION, SOLUTION INTRAMUSCULAR; INTRAVENOUS
Status: DISCONTINUED | OUTPATIENT
Start: 2020-12-21 | End: 2020-12-21 | Stop reason: HOSPADM

## 2020-12-21 RX ORDER — SODIUM CHLORIDE 9 MG/ML
125 INJECTION, SOLUTION INTRAVENOUS CONTINUOUS
Status: DISCONTINUED | OUTPATIENT
Start: 2020-12-21 | End: 2020-12-21 | Stop reason: HOSPADM

## 2020-12-21 RX ORDER — ACETAMINOPHEN 325 MG/1
650 TABLET ORAL
Qty: 60 TAB | Refills: 0 | Status: SHIPPED | OUTPATIENT
Start: 2020-12-21 | End: 2020-12-25

## 2020-12-21 RX ORDER — SODIUM CHLORIDE 9 MG/ML
25 INJECTION, SOLUTION INTRAVENOUS CONTINUOUS
Status: DISCONTINUED | OUTPATIENT
Start: 2020-12-21 | End: 2020-12-21 | Stop reason: HOSPADM

## 2020-12-21 RX ORDER — FENTANYL CITRATE 50 UG/ML
25 INJECTION, SOLUTION INTRAMUSCULAR; INTRAVENOUS
Status: COMPLETED | OUTPATIENT
Start: 2020-12-21 | End: 2020-12-21

## 2020-12-21 RX ORDER — HYDROMORPHONE HYDROCHLORIDE 1 MG/ML
0.2 INJECTION, SOLUTION INTRAMUSCULAR; INTRAVENOUS; SUBCUTANEOUS
Status: DISCONTINUED | OUTPATIENT
Start: 2020-12-21 | End: 2020-12-21 | Stop reason: HOSPADM

## 2020-12-21 RX ORDER — DIPHENHYDRAMINE HYDROCHLORIDE 50 MG/ML
12.5 INJECTION, SOLUTION INTRAMUSCULAR; INTRAVENOUS AS NEEDED
Status: DISCONTINUED | OUTPATIENT
Start: 2020-12-21 | End: 2020-12-21 | Stop reason: HOSPADM

## 2020-12-21 RX ORDER — PHENYLEPHRINE HCL IN 0.9% NACL 0.4MG/10ML
SYRINGE (ML) INTRAVENOUS AS NEEDED
Status: DISCONTINUED | OUTPATIENT
Start: 2020-12-21 | End: 2020-12-21 | Stop reason: HOSPADM

## 2020-12-21 RX ORDER — DOCUSATE SODIUM 100 MG/1
100 CAPSULE, LIQUID FILLED ORAL 2 TIMES DAILY
Qty: 40 CAP | Refills: 1 | Status: SHIPPED | OUTPATIENT
Start: 2020-12-21

## 2020-12-21 RX ORDER — GLYCOPYRROLATE 0.2 MG/ML
0.2 INJECTION INTRAMUSCULAR; INTRAVENOUS
Status: DISCONTINUED | OUTPATIENT
Start: 2020-12-21 | End: 2020-12-21 | Stop reason: HOSPADM

## 2020-12-21 RX ORDER — FENTANYL CITRATE 50 UG/ML
INJECTION, SOLUTION INTRAMUSCULAR; INTRAVENOUS AS NEEDED
Status: DISCONTINUED | OUTPATIENT
Start: 2020-12-21 | End: 2020-12-21 | Stop reason: HOSPADM

## 2020-12-21 RX ORDER — ESMOLOL HYDROCHLORIDE 10 MG/ML
INJECTION INTRAVENOUS AS NEEDED
Status: DISCONTINUED | OUTPATIENT
Start: 2020-12-21 | End: 2020-12-21 | Stop reason: HOSPADM

## 2020-12-21 RX ORDER — ROPIVACAINE HYDROCHLORIDE 5 MG/ML
30 INJECTION, SOLUTION EPIDURAL; INFILTRATION; PERINEURAL AS NEEDED
Status: DISCONTINUED | OUTPATIENT
Start: 2020-12-21 | End: 2020-12-21 | Stop reason: HOSPADM

## 2020-12-21 RX ORDER — DOCUSATE SODIUM 100 MG/1
200 CAPSULE, LIQUID FILLED ORAL DAILY
Status: DISCONTINUED | OUTPATIENT
Start: 2020-12-22 | End: 2020-12-21 | Stop reason: HOSPADM

## 2020-12-21 RX ORDER — MIDAZOLAM HYDROCHLORIDE 1 MG/ML
1 INJECTION, SOLUTION INTRAMUSCULAR; INTRAVENOUS AS NEEDED
Status: DISCONTINUED | OUTPATIENT
Start: 2020-12-21 | End: 2020-12-21 | Stop reason: HOSPADM

## 2020-12-21 RX ORDER — ACETAMINOPHEN 325 MG/1
975 TABLET ORAL EVERY 6 HOURS
Status: DISCONTINUED | OUTPATIENT
Start: 2020-12-21 | End: 2020-12-21 | Stop reason: HOSPADM

## 2020-12-21 RX ORDER — MIDAZOLAM HYDROCHLORIDE 1 MG/ML
INJECTION, SOLUTION INTRAMUSCULAR; INTRAVENOUS AS NEEDED
Status: DISCONTINUED | OUTPATIENT
Start: 2020-12-21 | End: 2020-12-21 | Stop reason: HOSPADM

## 2020-12-21 RX ORDER — TRAMADOL HYDROCHLORIDE 50 MG/1
50-100 TABLET ORAL
Status: DISCONTINUED | OUTPATIENT
Start: 2020-12-21 | End: 2020-12-21 | Stop reason: HOSPADM

## 2020-12-21 RX ORDER — LIDOCAINE HYDROCHLORIDE 10 MG/ML
0.1 INJECTION, SOLUTION EPIDURAL; INFILTRATION; INTRACAUDAL; PERINEURAL AS NEEDED
Status: DISCONTINUED | OUTPATIENT
Start: 2020-12-21 | End: 2020-12-21 | Stop reason: HOSPADM

## 2020-12-21 RX ORDER — PROPOFOL 10 MG/ML
INJECTION, EMULSION INTRAVENOUS AS NEEDED
Status: DISCONTINUED | OUTPATIENT
Start: 2020-12-21 | End: 2020-12-21 | Stop reason: HOSPADM

## 2020-12-21 RX ORDER — PANTOPRAZOLE SODIUM 20 MG/1
20 TABLET, DELAYED RELEASE ORAL
Status: DISCONTINUED | OUTPATIENT
Start: 2020-12-22 | End: 2020-12-21 | Stop reason: HOSPADM

## 2020-12-21 RX ORDER — ROCURONIUM BROMIDE 10 MG/ML
INJECTION, SOLUTION INTRAVENOUS AS NEEDED
Status: DISCONTINUED | OUTPATIENT
Start: 2020-12-21 | End: 2020-12-21 | Stop reason: HOSPADM

## 2020-12-21 RX ORDER — ONDANSETRON 2 MG/ML
4 INJECTION INTRAMUSCULAR; INTRAVENOUS AS NEEDED
Status: DISCONTINUED | OUTPATIENT
Start: 2020-12-21 | End: 2020-12-21 | Stop reason: HOSPADM

## 2020-12-21 RX ORDER — ALBUTEROL SULFATE 0.83 MG/ML
2.5 SOLUTION RESPIRATORY (INHALATION) AS NEEDED
Status: DISCONTINUED | OUTPATIENT
Start: 2020-12-21 | End: 2020-12-21 | Stop reason: HOSPADM

## 2020-12-21 RX ORDER — LIDOCAINE HYDROCHLORIDE 20 MG/ML
INJECTION, SOLUTION EPIDURAL; INFILTRATION; INTRACAUDAL; PERINEURAL AS NEEDED
Status: DISCONTINUED | OUTPATIENT
Start: 2020-12-21 | End: 2020-12-21 | Stop reason: HOSPADM

## 2020-12-21 RX ORDER — NALOXONE HYDROCHLORIDE 0.4 MG/ML
0.4 INJECTION, SOLUTION INTRAMUSCULAR; INTRAVENOUS; SUBCUTANEOUS AS NEEDED
Status: DISCONTINUED | OUTPATIENT
Start: 2020-12-21 | End: 2020-12-21 | Stop reason: HOSPADM

## 2020-12-21 RX ORDER — SUCCINYLCHOLINE CHLORIDE 20 MG/ML
INJECTION INTRAMUSCULAR; INTRAVENOUS AS NEEDED
Status: DISCONTINUED | OUTPATIENT
Start: 2020-12-21 | End: 2020-12-21 | Stop reason: HOSPADM

## 2020-12-21 RX ORDER — IBUPROFEN 400 MG/1
400 TABLET ORAL
Status: DISCONTINUED | OUTPATIENT
Start: 2020-12-21 | End: 2020-12-21 | Stop reason: HOSPADM

## 2020-12-21 RX ADMIN — ROCURONIUM BROMIDE 10 MG: 10 SOLUTION INTRAVENOUS at 11:06

## 2020-12-21 RX ADMIN — ROCURONIUM BROMIDE 10 MG: 10 SOLUTION INTRAVENOUS at 10:19

## 2020-12-21 RX ADMIN — CEFOTETAN DISODIUM 2 G: 2 INJECTION, POWDER, FOR SOLUTION INTRAMUSCULAR; INTRAVENOUS at 10:27

## 2020-12-21 RX ADMIN — SUGAMMADEX 200 MG: 100 INJECTION, SOLUTION INTRAVENOUS at 11:41

## 2020-12-21 RX ADMIN — KETOROLAC TROMETHAMINE 30 MG: 30 INJECTION, SOLUTION INTRAMUSCULAR; INTRAVENOUS at 11:36

## 2020-12-21 RX ADMIN — LIDOCAINE HYDROCHLORIDE 60 MG: 20 INJECTION, SOLUTION EPIDURAL; INFILTRATION; INTRACAUDAL; PERINEURAL at 10:19

## 2020-12-21 RX ADMIN — FENTANYL CITRATE 50 MCG: 50 INJECTION, SOLUTION INTRAMUSCULAR; INTRAVENOUS at 12:00

## 2020-12-21 RX ADMIN — ACETAMINOPHEN 650 MG: 325 TABLET ORAL at 09:42

## 2020-12-21 RX ADMIN — FENTANYL CITRATE 50 MCG: 50 INJECTION, SOLUTION INTRAMUSCULAR; INTRAVENOUS at 10:57

## 2020-12-21 RX ADMIN — Medication 40 MCG: at 10:23

## 2020-12-21 RX ADMIN — FENTANYL CITRATE 100 MCG: 50 INJECTION, SOLUTION INTRAMUSCULAR; INTRAVENOUS at 10:19

## 2020-12-21 RX ADMIN — MIDAZOLAM HYDROCHLORIDE 5 MG: 1 INJECTION, SOLUTION INTRAMUSCULAR; INTRAVENOUS at 10:06

## 2020-12-21 RX ADMIN — FENTANYL CITRATE 25 MCG: 50 INJECTION, SOLUTION INTRAMUSCULAR; INTRAVENOUS at 12:36

## 2020-12-21 RX ADMIN — ESMOLOL HYDROCHLORIDE 30 MG: 10 INJECTION, SOLUTION INTRAVENOUS at 11:29

## 2020-12-21 RX ADMIN — PROPOFOL 150 MG: 10 INJECTION, EMULSION INTRAVENOUS at 10:19

## 2020-12-21 RX ADMIN — FENTANYL CITRATE 25 MCG: 50 INJECTION, SOLUTION INTRAMUSCULAR; INTRAVENOUS at 12:25

## 2020-12-21 RX ADMIN — Medication 80 MCG: at 10:22

## 2020-12-21 RX ADMIN — FENTANYL CITRATE 50 MCG: 50 INJECTION, SOLUTION INTRAMUSCULAR; INTRAVENOUS at 11:25

## 2020-12-21 RX ADMIN — PROPOFOL 50 MG: 10 INJECTION, EMULSION INTRAVENOUS at 11:18

## 2020-12-21 RX ADMIN — ACETAMINOPHEN 975 MG: 325 TABLET ORAL at 13:59

## 2020-12-21 RX ADMIN — FENTANYL CITRATE 25 MCG: 50 INJECTION, SOLUTION INTRAMUSCULAR; INTRAVENOUS at 12:42

## 2020-12-21 RX ADMIN — DEXAMETHASONE SODIUM PHOSPHATE 8 MG: 4 INJECTION, SOLUTION INTRAMUSCULAR; INTRAVENOUS at 10:54

## 2020-12-21 RX ADMIN — SODIUM CHLORIDE, POTASSIUM CHLORIDE, SODIUM LACTATE AND CALCIUM CHLORIDE 1000 ML: 600; 310; 30; 20 INJECTION, SOLUTION INTRAVENOUS at 10:02

## 2020-12-21 RX ADMIN — FENTANYL CITRATE 25 MCG: 50 INJECTION, SOLUTION INTRAMUSCULAR; INTRAVENOUS at 12:57

## 2020-12-21 RX ADMIN — Medication 40 MCG: at 10:26

## 2020-12-21 RX ADMIN — ONDANSETRON HYDROCHLORIDE 4 MG: 2 INJECTION, SOLUTION INTRAMUSCULAR; INTRAVENOUS at 11:34

## 2020-12-21 RX ADMIN — ROCURONIUM BROMIDE 20 MG: 10 SOLUTION INTRAVENOUS at 10:28

## 2020-12-21 RX ADMIN — SUCCINYLCHOLINE CHLORIDE 140 MG: 20 INJECTION, SOLUTION INTRAMUSCULAR; INTRAVENOUS at 10:19

## 2020-12-21 RX ADMIN — TRAMADOL HYDROCHLORIDE 50 MG: 50 TABLET, FILM COATED ORAL at 13:19

## 2020-12-21 RX ADMIN — SODIUM CHLORIDE 125 ML/HR: 9 INJECTION, SOLUTION INTRAVENOUS at 12:41

## 2020-12-21 RX ADMIN — Medication 40 MCG: at 10:24

## 2020-12-21 NOTE — INTERVAL H&P NOTE
Date of Surgery Update: 
Noemy Chandler was seen and examined. History and physical has been reviewed. The patient has been examined.  There have been no significant clinical changes since the completion of the originally dated History and Physical. 
 
Signed By: Queen Yamilet MD   
 December 21, 2020 9:18 AM

## 2020-12-21 NOTE — PERIOP NOTES
TRANSFER - OUT REPORT:    Verbal report given to Jackie Thomas RN on [facility]  being transferred to [facility] for routine post - op       Report consisted of patients Situation, Background, Assessment and   Recommendations(SBAR). Time Pre op antibiotic given:1027  Anesthesia Stop time: 5314  Sheehan Present on Transfer to floor:no  Order for Sheehan on Chart:no  Discharge Prescriptions with Chart:no    Information from the following report(s) SBAR, OR Summary, Intake/Output and MAR was reviewed with the receiving nurse. Opportunity for questions and clarification was provided. Is the patient on 02? NO       L/Min -       Other -    Is the patient on a monitor? NO    Is the nurse transporting with the patient? NO    Surgical Waiting Area notified of patient's transfer from PACU? YES      The following personal items collected during your admission accompanied patient upon transfer:   Dental Appliance: Dental Appliances: (clothes, glasses, headband returned in pacu)  Vision:    Hearing Aid:    Jewelry: Jewelry: None  Clothing: Clothing: Footwear, Pants, Shirt(clothes to pacu)  Other Valuables:  Other Valuables: Eyeglasses(glasses to pacu)  Valuables sent to safe:

## 2020-12-21 NOTE — PROGRESS NOTES
T.O.C:   Pt expected to d/c to home   Family to provide transport at d/c   Emergency Contact: Sheryl Manzo, spouse, [phone]      CM met with pt at bedside; verified demographics, insurance, PCP and emergency contact. Pt expected to d/c to home with family to provide transport. Prior to admission pt ambulates independently; has adequate support. Pt does not have AMD, has AMD forms, does not want to complete today. CM explained decision maker as next of kin, pt indicated she understood. No other issues at present. CM will continue to follow. Doss Merlin, RN    Care Management Interventions  PCP Verified by CM: Yes(April 2020)  Palliative Care Criteria Met (RRAT>21 & CHF Dx)?: No  MyChart Signup: Yes(active)  Discharge Durable Medical Equipment: No  Physical Therapy Consult: No  Occupational Therapy Consult: No  Speech Therapy Consult: No  Current Support Network: Lives with Spouse(2 adult children in home)  Confirm Follow Up Transport: Family  The Plan for Transition of Care is Related to the Following Treatment Goals : medically stable  The Patient and/or Patient Representative was Provided with a Choice of Provider and Agrees with the Discharge Plan?: (n/a)  Freedom of Choice List was Provided with Basic Dialogue that Supports the Patient's Individualized Plan of Care/Goals, Treatment Preferences and Shares the Quality Data Associated with the Providers?: (n/a)  Discharge Location  Discharge Placement: Home with family assistance    Doss Merlin, RN    Observation notice provided in writing to patient and/or caregiver as well as verbal explanation of the policy. Patients who are in outpatient status also receive the Observation notice.       Doss Merlin, RN

## 2020-12-21 NOTE — OP NOTES
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 He assisted with dissection, development of the retroperitoneal spaces, and identification of pertinent anatomy during the procedure. Anesthesia:  General endotracheal anesthesia    EBL:  <10 cc     Preoperative antibiotics: Cefotetan 2 grams IV    VTE Prophylaxis: SCDs     Complications:  None    Implants: none    Specimens:  Uterus, cervix, bilateral fallopian tubes and ovaries, pelvic washings, and bilateral sentinel pelvic lymph nodes     Operative indications:  64 y.o. female with complex atypical hyperplasia of the endometrium with concerns for endometrial cancer. Operative findings: On laparoscopic survey, normal appearing uterus and bilateral tubes/ovaries. Right sentinel lymph node mapped to right external iliac vessels. Left sentinel lymph node mapped to left external iliac vessels. Normal appearing omentum, liver edge, diaphragm, small bowel, colon, and peritoneum. Given the preoperative diagnosis, the decision was made to proceed with sentinel lymph node mapping and biopsy as the preoperative diagnosis was concerning for outright endometrial cancer. I did not want to miss a small cancer on frozen pathology. Operative note:  After the risks, benefits, indications, and alternatives of the procedure were discussed with the patient and informed consent was obtained, the patient was taken to the operating room.   She was positively identified, administered general anesthesia, and then placed in the dorsal lithotomy position in [address] [place] [address]. An exam under anesthesia was performed with the above findings. She was then prepped and draped in the usual fashion. A grimm catheter was inserted. A speculum was placed in the vagina and the cervix was injected with 3-cc of indigocynanine green at 3- and 9-o'clock. Then a V-care uterine manipulator was placed without difficulty. A 55QZ umbilical incision was then made with #15-blade and carried down to the underlying fascia. The fascia was incised and the peritoneal cavity entered via an open Rosibel technique. 10-mm balloon trocar was then placed and intra-peritoneal placement confirmed via direct visualization. The abdomen was surveyed with the above findings. The abdomen was then insufflated with CO2 to a pressure of 15mmHg. Bilateral right and left lower quadrant 5-mm trocars were then inserted under direct visualization, along with a 5-mm supra-pubic trocar. The patient was placed in Trendelenburg and pelvic washings were obtained. Attention was then turned to the pelvis. Bilateral round ligaments were sealed and divided with the LigaSure device and the bilateral pelvic side-wall retroperitoneum entered and developed. Bilateral ureters were identified and preserved. [place] lymph node mapping identified the above sentinel lymph nodes. Bilateral sentinel lymph nodes were skeletonized and removed and sent for permanent pathology. Bilateral infundibulopelvic ligaments were skeletonized, then sealed and divided with the LigaSure device. Dissection was continued inferiorly along the broad ligament and the bladder dissected off the lower uterine segment and cervix. Bilateral uterine arteries were skeletonized, then sealed and divided with the LigaSure device. A circumferential colpotomy was then carried along the V-care.  The specimen was then removed via the vagina and sent for permanent pathology. The vaginal colpotomy was closed using the EndoStitch with 0-Vicryl V-lock suture. The pelvis was irrigated and made hemostatic. The intra-abdominal pressure was then decreased and all planes of dissection were confirmed hemostatic. Fibrillar was placed along all dissection planes. The insufflation was released prior to removal of all port sites, then all trocars were removed. The umbilical fascia was reapproximated with a figure-of-8 stitch using 0-Vicryl on a UR-6 needle. All skin incisions were closed with 4-0 monocryl subcuticular stitch. Skin glue was applied to all skin incisions. The vagina was inspected with an intact vaginal cuff and no evidence of lacerations. The patient was taken out of stirrups, awakened from anesthesia, and taken to the recovery room in stable condition. The patient tolerated the procedure well. All instrument, sponge, and needle counts were correct.         Benny Gonzalez MD  12/21/2020  11:44 AM

## 2020-12-21 NOTE — ROUTINE PROCESS
Patient: Ulysses Solders MRN: [id number]  SSN: [id number] YOB: 1960  Age: 64 y.o. Sex: female Patient is status post Procedure(s): 
TOTAL LAPAROSCOPIC HYSTERECTOMY, BILATERAL SALPINGO OOPHORECTOMY, SENTINEL NODE MAPPING AND DISSECTION. Surgeon(s) and Role: Jono Ramos MD - Primary Local/Dose/Irrigation:  30ML MARCAINE 0.5% Peripheral IV 12/21/20 Anterior;Right Hand (Active) Site Assessment Clean, dry, & intact 12/21/20 1001 Phlebitis Assessment 0 12/21/20 1001 Infiltration Assessment 0 12/21/20 1001 Dressing Status Clean, dry, & intact 12/21/20 1001 Dressing Type Transparent;Tape 12/21/20 1001 Hub Color/Line Status Pink; Infusing 12/21/20 1001 Airway - Endotracheal Tube 12/21/20 Oral (Active) Dressing/Packing:  Incision 12/21/20 Abdomen-Dressing/Treatment: Skin glue(DERMABOND) (12/21/20 1118) Incision 12/21/20 Vagina-Dressing/Treatment: Hayley Kalata (12/21/20 1118) Splint/Cast:  ] Other:  NA

## 2020-12-21 NOTE — DISCHARGE INSTRUCTIONS
[facility] [address]  P [phone]  F [phone]     Bryson Leblanc      Dear MsTasha Mariia Molina,      Please review your instructions with your nurse and ask any questions so you have all the information you need to recover well at home. If you do not feel you have everything you need to succeed and be safe after you leave the hospital, please discuss these concerns with your nurse. As always, call for any questions at home. Your doctor: Dr. Misty Uriostegui  Diagnosis: COMPLEX ENDOMETRIAL HYPERPLASIA  Procedure: Procedure(s):  TOTAL LAPAROSCOPIC HYSTERECTOMY, BILATERAL SALPINGO OOPHORECTOMY, SENTINEL NODE MAPPING AND DISSECTION  Date of Discharge: 12/21/2020      Take Home Medications     See Discharge Medication Review provided to you by your nurse. If you did not receive one, request this prior to your discharge. · It is important that you take your medications as they are prescribed. Your prescriptions are sent to your pharmacy on record. · Keep your medications in the bottles provided by the pharmacist and keep a list of the medication names, dosages, times to be taken and what they are for in your wallet. · Do not take other medications without consulting your doctor. · If you are prescribed enoxaparin (Lovenox) and are taking a baby aspirin daily, please hold this medication until your course of enoxaparin is completed. · You may take acetaminophen (Tylenol) and ibuprofen (Advil) for pain. If this is not sufficient for your pain, take tramadol or other pain medication you were provided. · You should take a daily gentle stool softener such as a colace pill or dulcolax suppository for constipation as this is not uncommon after surgery and/or while on pain medication. If not prescribed, this can be found over the counter. If constipation persists for >24 hours you should take a dose of Milk of Magnesia.  Call if your constipation continues. Diet    · Stay hydrated and drink fluids such as gatorade and water. This will also help prevent constipation and dehydration. Limit somewhat any usual caffeine intake of beverages such as soda, tea and coffee as this may serve to dehydrate you. · For the first 2-3 days keep a low fiber diet avoiding raw vegetables or fruits with skin. A diet consisting of soup, cereal, yogurt, eggs, fish, Boost/Ensure. Avoid fatty/greasy foods. · If nauseated, keep your diet limited to liquids and call if this persists. Activity    · If possible, have someone with you at all times until you feel stable. · Gradually increase your activity each day. There are generally no restrictions on walking, climbing stairs or riding in a car. Walk at least 4 times per day. Walking will help reduce the risk of blood clots and constipation. · Showers are okay. If you have an incision, no tub bathing/swimming for two weeks. · No driving for 2 week and/or while on pain medication. · The following restrictions apply:  1. No heavy lifting greater than 15 lbs for 4 weeks, then 25 lbs for the next 4 weeks. 2. If you had any vaginal procedure or a hysterectomy, nothing per vagina for 6-8 weeks. 3. You may experience vaginal spotting. Should the bleeding require more that 4 pads a day please call our office. Incision    · You should expect some discomfort in the area of your incision, particularly as you increase your activity. If you notice an area of increasing redness or new drainage, please call your doctor. · Your incisions will have buried, absorbable sutures, which do not need to be removed and are covered by protective glue. This will come off over time. They are covered with a surgical glue. Please allow this to fall off on it's own and refrain from peeling it off unless it is no longer covering the incision.       Causes For Concern    If any of the following occur, please call our office and speak with the Nurse/aid who will help you with your problem or ask the doctor to call you.  Problems with the incision, including increasing pain, swelling, redness or drainage.  Inability to pass urine    Increasing abdominal pain despite medication   Persisting nausea or vomiting.  Fever or chills and a temperature >101F   Constipation (no bowel movement for three days).  Diarrhea (more than three watery stools within 24 hours).  Excessive vaginal or wound bleeding.  If after hours and you cannot reach an on-call physician, call 911. Follow-Up    Call [phone] to schedule the following appointments with Dr. Pancho Chew:   Telephone virtual-visit in 10-14 days to discuss your pathology results.  In-office visit for your postoperative exam in 6 weeks from surgery. Information obtained by :  I understand that if any problems occur once I am at home I am to contact my physician. I understand and acknowledge receipt of the instructions indicated above. Physician's or R.N.'s Signature                                                                  Date/Time                                                                                                                                              Patient or Representative Signature                                                          Date/Time      ______________________________________________________________________    Anesthesia Discharge Instructions    After general anesthesia or intervenous sedation, for 24 hours or while taking prescription Narcotics:  · Limit your activities  · Do not drive or operate hazardous machinery  · If you have not urinated within 8 hours after discharge, please contact your surgeon on call.   · Do not make important personal or business decisions  · Do not drink alcoholic beverages    Report the following to your surgeon:  · Excessive pain, swelling, redness or odor of or around the surgical area  · Temperature over 100.5 degrees  · Nausea and vomiting lasting longer than 4 hours or if unable to take medication  · Any signs of decreased circulation or nerve impairment to extremity:  Change in color, persistent numbness, tingling, coldness or increased pain.   · Any questions

## 2020-12-21 NOTE — BRIEF OP NOTE
Brief Postoperative Note    Patient: Ulysses Solders  YOB: 1960  MRN: [id number]    Date of Procedure: 12/21/2020     Pre-Op Diagnosis: COMPLEX ENDOMETRIAL HYPERPLASIA WITH FOCAL CONCERNS FOR FIGO GRADE 1 ENDOMETRIAL CANCER    Post-Op Diagnosis: Same as preoperative diagnosis. Procedure(s):  TOTAL LAPAROSCOPIC HYSTERECTOMY, BILATERAL SALPINGO OOPHORECTOMY, SENTINEL NODE MAPPING AND DISSECTION    Surgeon(s): Kamari Pierce MD    Surgical Assistant: Physician Assistant: Suzanne Domínguez PA-C    Anesthesia: General     Estimated Blood Loss (mL): Minimal    Complications: None    Specimens:   ID Type Source Tests Collected by Time Destination   1 : RIGHT PELVIC SENTINEL LYMPH NODE Fresh Lymph Node  Kamari Pierce MD 12/21/2020 1057 Pathology   2 : LEFT PELVIC SENTINEL LYMPH NODE Fresh Lymph Node  Kamari Pierce MD 12/21/2020 1057 Pathology   3 : UTERUS, CERVIX, BILATERAL FALLOPIAN TUBES AND OVARIES Fresh Uterus with Bilateral Fallopian tubes and Ovaries  Kamari Pierce MD 12/21/2020 1106 Pathology   1 : PELVIC WASHINGS Fresh Pelvis  Kamari Pierce MD 12/21/2020 1103 Cytology        Implants: * No implants in log *    Drains: * No LDAs found *    Findings: On laparoscopic survey, normal appearing uterus and bilateral tubes/ovaries. Right sentinel lymph node mapped to right external iliac vessels. Left sentinel lymph node mapped to left external iliac vessels. Normal appearing omentum, liver edge, diaphragm, small bowel, colon, and peritoneum.      Electronically Signed by Blank Hoffman MD on 12/21/2020 at 11:42 AM

## 2020-12-21 NOTE — PROGRESS NOTES
TRANSFER - IN REPORT:    Verbal report received from [facility]) on Jj Sheridan  being received from PACU(unit) for routine post - op    Report consisted of patients Situation, Background, Assessment and   Recommendations(SBAR). Information from the following report(s) SBAR, Kardex, Intake/Output, MAR and Recent Results was reviewed with the receiving nurse. Opportunity for questions and clarification was provided. Assessment completed upon patients arrival to unit and care assumed. 1520: Pt ambulated in hallway, voided 200mL, tolerated lunch, and tolerated PO pain medicine. per MD patient ok to discharge    [phone]: Discharge medications reviewed with patient and spouse and appropriate educational materials and side effects teaching were provided. I have reviewed discharge instructions with the patient and spouse. The patient and spouse verbalized understanding.     1635: pt left via wheelchair to discharge

## 2020-12-21 NOTE — ANESTHESIA POSTPROCEDURE EVALUATION
Post-Anesthesia Evaluation and Assessment    Patient: Violetta Chowdhury MRN: [id number]  SSN: [id number]    YOB: 1960  Age: 64 y.o. Sex: female      I have evaluated the patient and they are stable and ready for discharge from the PACU. Cardiovascular Function/Vital Signs  Visit Vitals  /81 (BP 1 Location: Left arm, BP Patient Position: At rest;Supine)   Pulse 75   Temp 36.6 °C (97.8 °F)   Resp 15   Ht 5' 3\" (1.6 m)   Wt 67.1 kg (148 lb)   SpO2 98%   BMI 26.22 kg/m²       Patient is status post General anesthesia for Procedure(s):  TOTAL LAPAROSCOPIC HYSTERECTOMY, BILATERAL SALPINGO OOPHORECTOMY, SENTINEL NODE MAPPING AND DISSECTION. Nausea/Vomiting: None    Postoperative hydration reviewed and adequate. Pain:  Pain Scale 1: Numeric (0 - 10) (12/21/20 1318)  Pain Intensity 1: 6 (12/21/20 1318)   Managed    Neurological Status:   Neuro (WDL): Within Defined Limits (12/21/20 1238)  Neuro  Neurologic State: Alert (12/21/20 1238)  LUE Motor Response: Purposeful (12/21/20 1200)  LLE Motor Response: Purposeful (12/21/20 1200)  RUE Motor Response: Purposeful (12/21/20 1200)  RLE Motor Response: Purposeful (12/21/20 1200)   At baseline    Mental Status, Level of Consciousness: Alert and  oriented to person, place, and time    Pulmonary Status:   O2 Device: Room air (12/21/20 1238)   Adequate oxygenation and airway patent    Complications related to anesthesia: None    Post-anesthesia assessment completed. No concerns    Signed By: Yohana Hewitt MD     December 21, 2020              Procedure(s):  TOTAL LAPAROSCOPIC HYSTERECTOMY, BILATERAL SALPINGO OOPHORECTOMY, SENTINEL NODE MAPPING AND DISSECTION. general    <BSHSIANPOST>    INITIAL Post-op Vital signs:   Vitals Value Taken Time   /76 12/21/20 1245   Temp 36.4 °C (97.6 °F) 12/21/20 1238   Pulse 67 12/21/20 1255   Resp 10 12/21/20 1255   SpO2 92 % 12/21/20 1255   Vitals shown include unvalidated device data.

## 2020-12-21 NOTE — PERIOP NOTES
Family called and informed of patient's progress at start of case    SURGICEL [address] PRN USE  [facility] [id number]  EXP 3/31/23

## 2020-12-21 NOTE — ANESTHESIA PREPROCEDURE EVALUATION
Relevant Problems   No relevant active problems       Anesthetic History   No history of anesthetic complications            Review of Systems / Medical History  Patient summary reviewed, nursing notes reviewed and pertinent labs reviewed    Pulmonary  Within defined limits                 Neuro/Psych   Within defined limits           Cardiovascular                  Exercise tolerance: >4 METS     GI/Hepatic/Renal     GERD           Endo/Other        Arthritis     Other Findings              Physical Exam    Airway  Mallampati: II  TM Distance: > 6 cm  Neck ROM: normal range of motion   Mouth opening: Normal     Cardiovascular  Regular rate and rhythm,  S1 and S2 normal,  no murmur, click, rub, or gallop             Dental  No notable dental hx       Pulmonary  Breath sounds clear to auscultation               Abdominal  GI exam deferred       Other Findings            Anesthetic Plan    ASA: 2  Anesthesia type: general          Induction: Intravenous  Anesthetic plan and risks discussed with: Patient

## 2021-02-03 ENCOUNTER — OFFICE VISIT (OUTPATIENT)
Dept: GYNECOLOGY | Age: 61
End: 2021-02-03
Payer: MEDICARE

## 2021-02-03 VITALS
BODY MASS INDEX: 26.26 KG/M2 | HEART RATE: 94 BPM | WEIGHT: 148.2 LBS | DIASTOLIC BLOOD PRESSURE: 83 MMHG | SYSTOLIC BLOOD PRESSURE: 109 MMHG | HEIGHT: 63 IN | TEMPERATURE: 96.6 F

## 2021-02-03 DIAGNOSIS — N85.00 ENDOMETRIAL HYPERPLASIA: ICD-10-CM

## 2021-02-03 DIAGNOSIS — Z09 POSTOPERATIVE EXAMINATION: Primary | ICD-10-CM

## 2021-02-03 PROCEDURE — 99024 POSTOP FOLLOW-UP VISIT: CPT | Performed by: OBSTETRICS & GYNECOLOGY

## 2021-02-03 NOTE — PROGRESS NOTES
Post op Visit, surgery was on 12/21/2020    1. Have you been to the ER, urgent care clinic since your last visit? Hospitalized since your last visit?  no  2. Have you seen or consulted any other health care providers outside of the [address] [place] [address] since your last visit? Include any pap smears or colon screening.    no

## 2021-02-03 NOTE — PROGRESS NOTES
[address] [place] [address] [address]ias [address], [address] [facility]  P [phone]  F [phone]    Office Note  Patient ID:  Name:  Carlie Carlson  MRN:  [id number]  :  1960/60 y.o. Date:  2021      HISTORY OF PRESENT ILLNESS:  Ms. Carlie Carlson is a 64 y.o. 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 On 2020 underwent Total laparoscopic hysterectomy with bilateral salpingo-oophorectomy, Bilateral sentinel lymph node mapping and biopsy. Final pathology without evidence of malignancy. Presents back today for postoperative visit. She is doing well without complaints. Initial History:  Carlie Carlson is a 64 y.o.  postmenopausal female who is being seen for possible endometrial cancer. She is referred by Dr. Jacque Melgar in [facility]. She presented for evaluation of postmenopausal bleeding. She was taken to the OR for a hysteroscopy/D&C. 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 I have been asked to see her in consultation for further evaluation and management. Pathology Review:   2020:  FINAL PATHOLOGIC DIAGNOSIS   1. [place] lymph nodes, right pelvic, excision:   Two lymph nodes, negative for metastatic carcinoma (0/2)   Three levels and a cytokeratin stain are negative   2. [place] lymph node, left pelvic, excision:   One lymph node, negative for metastatic carcinoma (0/1)   Three levels and a cytokeratin stain are negative   3.  Uterus, cervix, bilateral ovaries and fallopian tubes, hysterectomy and salpingo-oophorectomy:   Cervix:   Chronic endocervicitis   Nabothian cysts   Uterus:   Intramural leiomyoma, 0.5 cm   Adenomyosis   Atrophic endometrium; negative for hyperplasia or malignancy   Ovaries:   Physiologic changes   Fallopian tubes:   No specific pathologic diagnosis     ROS:   and GI review:  Negative  Cardiopulmonary review:  Negative   Musculoskeletal:  Negative    A comprehensive review of systems was negative except for that written in the History of Present Illness. , 10 point ROS        Problem List:  Patient Active Problem List    Diagnosis Date Noted    Complex endometrial hyperplasia 12/21/2020    PMB (postmenopausal bleeding) 12/15/2020    Endometrial hyperplasia 12/15/2020     PMH:  Past Medical History:   Diagnosis Date    Arthritis     Depression     Nerve damage     right leg from surgery       PSH:  Past Surgical History:   Procedure Laterality Date    HX DILATION AND CURETTAGE  1993    HX HYSTEROSCOPY      D&C    HX LUMBAR LAMINECTOMY  09/05/2018    HX OTHER SURGICAL  1989    broken nose      Social History:  Social History     Tobacco Use    Smoking status: Current Every Day Smoker     Packs/day: 1.00     Years: 29.00     Pack years: 29.00    Smokeless tobacco: Never Used   Substance Use Topics    Alcohol use: Never     Frequency: Never      Family History:  Family History   Problem Relation Age of Onset    Lung Disease Mother     Heart Disease Mother     Lung Disease Father     Lung Disease Brother     Anesth Problems Neg Hx       Medications: (reviewed)  Current Outpatient Medications   Medication Sig    melatonin 5 mg cap capsule Take  by mouth nightly.  omeprazole (PRILOSEC OTC) 20 mg tablet Take 20 mg by mouth daily as needed.  ibuprofen (MOTRIN) 200 mg tablet Take 400 mg by mouth every six (6) hours as needed.  diclofenac (VOLTAREN) 1 % gel Apply 2 g to affected area two (2) times daily as needed.  cyclobenzaprine (FLEXERIL) 5 mg tablet Take 5 mg by mouth At bedtime.  clonazePAM (KlonoPIN) 0.5 mg tablet TAKE 1/2 TO 1 TABLET AT BEDTIME AS NEEDED ANXIETY OR SLEEP    docusate sodium (Colace) 100 mg capsule Take 1 Cap by mouth two (2) times a day. Hold for loose stools. No current facility-administered medications for this visit. Allergies: (reviewed)  Allergies   Allergen Reactions    Ciprofloxacin Palpitations          OBJECTIVE:  Physical Exam:  Visit Vitals  /83 (BP 1 Location: Left upper arm, BP Patient Position: Sitting)   Pulse 94   Temp (!) 96.6 °F (35.9 °C) (Temporal)   Ht 5' 3\" (1.6 m)   Wt 148 lb 3.2 oz (67.2 kg)   BMI 26.25 kg/m²      General: Alert and oriented. No acute distress. Well-nourished  Gastrointestinal: soft, non-tender, non-distended, no masses or organomegaly. Well-healed port-site incisions. Musculoskeletal: normal gait. No joint tenderness, deformity or swelling. No muscular tenderness. Extremities: extremities normal, atraumatic, no cyanosis or edema. Pelvic: exam chaperoned by nurse. Normal appearing external genitalia. On speculum exam, the vaginal cuff is intact and healing well. On bimanual, the uterus and cervix are surgically absent. Vaginal cuff intact without defect. No evidence of masses or nodularity on bimanual exam. Deferred rectovaginal exam.   Neuro: Grossly intact. Normal gait and movement. No acute deficit  Skin: No evidence of rashes or skin changes. IMPRESSION/PLAN:  Hesham Levine is a 64 y.o. 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 On 12/21/2020 underwent Total laparoscopic hysterectomy with bilateral salpingo-oophorectomy, Bilateral sentinel lymph node mapping and biopsy. Final pathology without evidence of malignancy. Patient Active Problem List   Diagnosis Code    PMB (postmenopausal bleeding) N95.0    Endometrial hyperplasia N85.00    Complex endometrial hyperplasia N85.01     Reviewed patient's course to date, including her final surgical pathology without evidence of malignancy. She is healing well from surgery. Given the patient's benign pathology, she will be discharged from Gynecologic Oncology clinic. Encouraged patient to follow-up with her PCP for continued routine health care maintenance.  All questions and concerns were addressed with the patient and she is comfortable with the plan. An electronic signature was used to authenticate this note.      James Hayden MD

## 2022-03-18 PROBLEM — N95.0 PMB (POSTMENOPAUSAL BLEEDING): Status: ACTIVE | Noted: 2020-12-15

## 2022-03-19 PROBLEM — N85.01 COMPLEX ENDOMETRIAL HYPERPLASIA: Status: ACTIVE | Noted: 2020-12-21

## 2022-03-19 PROBLEM — N85.00 ENDOMETRIAL HYPERPLASIA: Status: ACTIVE | Noted: 2020-12-15

## 2023-05-15 RX ORDER — OMEPRAZOLE 20 MG/1
20 TABLET, DELAYED RELEASE ORAL DAILY PRN
COMMUNITY

## 2023-05-15 RX ORDER — CLONAZEPAM 0.5 MG/1
TABLET ORAL
COMMUNITY
Start: 2020-09-18

## 2023-05-15 RX ORDER — IBUPROFEN 200 MG
400 TABLET ORAL EVERY 6 HOURS PRN
COMMUNITY

## 2023-05-15 RX ORDER — PSEUDOEPHEDRINE HCL 30 MG
100 TABLET ORAL 2 TIMES DAILY
COMMUNITY
Start: 2020-12-21

## 2023-05-15 RX ORDER — CYCLOBENZAPRINE HCL 5 MG
5 TABLET ORAL NIGHTLY
COMMUNITY

## (undated) DEVICE — STERILE POLYISOPRENE POWDER-FREE SURGICAL GLOVES WITH EMOLLIENT COATING: Brand: PROTEXIS

## (undated) DEVICE — GARMENT,MEDLINE,DVT,INT,CALF,MED, GEN2: Brand: MEDLINE

## (undated) DEVICE — SOLUTION IV 1000ML 0.9% SOD CHL

## (undated) DEVICE — REM POLYHESIVE ADULT PATIENT RETURN ELECTRODE: Brand: VALLEYLAB

## (undated) DEVICE — SYR 50ML LR LCK 1ML GRAD NSAF --

## (undated) DEVICE — TROCAR: Brand: KII® SLEEVE

## (undated) DEVICE — AGENT HEMSTAT W4XL4IN OXIDIZED REGENERATED CELOS ABSRB SFT

## (undated) DEVICE — BLADE ASSEMB CLP HAIR FINE --

## (undated) DEVICE — VCARE MEDIUM, UTERINE MANIPULATOR, VAGINAL-CERVICAL-AHLUWALIA'S-RETRACTOR-ELEVATOR: Brand: VCARE

## (undated) DEVICE — Device

## (undated) DEVICE — DERMABOND SKIN ADH 0.7ML -- DERMABOND ADVANCED 12/BX

## (undated) DEVICE — DEVICE RELD SZ 0 L8IN GRN ABSRB FOR ENDO SILS STIT DEVS

## (undated) DEVICE — PREP SKN CHLRAPRP APL 26ML STR --

## (undated) DEVICE — TRAY PREP DRY W/ PREM GLV 2 APPL 6 SPNG 2 UNDPD 1 OVERWRAP

## (undated) DEVICE — LAPAROSCOPIC TROCAR SLEEVE/SINGLE USE: Brand: KII® OPTICAL ACCESS SYSTEM

## (undated) DEVICE — PAD PT POS 36 IN SURGYPAD DISP

## (undated) DEVICE — INTENDED FOR TISSUE SEPARATION, AND OTHER PROCEDURES THAT REQUIRE A SHARP SURGICAL BLADE TO PUNCTURE OR CUT.: Brand: BARD-PARKER ® CARBON RIB-BACK BLADES

## (undated) DEVICE — PAD,SANITARY,11 IN,MAXI,N-STRL,IND WRAP: Brand: MEDLINE

## (undated) DEVICE — SUTURING DEVICE: Brand: ENDO STITCH

## (undated) DEVICE — SEALER ENDOSCP L37CM NANO COAT BLNT TIP LAP DIV

## (undated) DEVICE — NEEDLE HYPO 22GA L1.5IN BLK S STL HUB POLYPR SHLD REG BVL

## (undated) DEVICE — SURGICAL PROCEDURE PACK GYN LAPAROSCOPY CUST SMH LF

## (undated) DEVICE — COVER LT HNDL PLAS RIG 1 PER PK

## (undated) DEVICE — SUTURE SZ 0 27IN 5/8 CIR UR-6  TAPER PT VIOLET ABSRB VICRYL J603H

## (undated) DEVICE — TOTAL TRAY, DB, 100% SILI FOLEY, 16FR 10: Brand: MEDLINE

## (undated) DEVICE — FILTER SMK EVAC FLO CLR MEGADYNE

## (undated) DEVICE — SYR 10ML LUER LOK 1/5ML GRAD --

## (undated) DEVICE — STERILE NEOPRENE POWDER-FREE SURGICAL GLOVES WITH NITRILE COATING: Brand: PROTEXIS

## (undated) DEVICE — STRAP,POSITIONING,KNEE/BODY,FOAM,4X60": Brand: MEDLINE

## (undated) DEVICE — INFECTION CONTROL KIT SYS

## (undated) DEVICE — TROCARS: Brand: KII® BALLOON BLUNT TIP SYSTEM

## (undated) DEVICE — SUTURE MCRYL SZ 4-0 L27IN ABSRB UD L19MM PS-2 1/2 CIR PRIM Y426H

## (undated) DEVICE — SCISSORS ENDOSCP DIA5MM CRV MPLR CAUT W/ RATCH HNDL

## (undated) DEVICE — VISUALIZATION SYSTEM: Brand: CLEARIFY

## (undated) DEVICE — DRAPE,REIN 53X77,STERILE: Brand: MEDLINE